# Patient Record
Sex: FEMALE | Race: WHITE
[De-identification: names, ages, dates, MRNs, and addresses within clinical notes are randomized per-mention and may not be internally consistent; named-entity substitution may affect disease eponyms.]

---

## 2023-08-14 ENCOUNTER — HOSPITAL ENCOUNTER (EMERGENCY)
Dept: HOSPITAL 15 - ER | Age: 58
Discharge: HOME | End: 2023-08-14
Payer: MEDICARE

## 2023-08-14 VITALS
TEMPERATURE: 97.7 F | RESPIRATION RATE: 17 BRPM | OXYGEN SATURATION: 96 % | SYSTOLIC BLOOD PRESSURE: 169 MMHG | HEART RATE: 63 BPM | DIASTOLIC BLOOD PRESSURE: 84 MMHG

## 2023-08-14 VITALS — WEIGHT: 209.66 LBS | BODY MASS INDEX: 38.58 KG/M2 | HEIGHT: 62 IN

## 2023-08-14 DIAGNOSIS — Z87.442: ICD-10-CM

## 2023-08-14 DIAGNOSIS — Z98.890: ICD-10-CM

## 2023-08-14 DIAGNOSIS — R10.12: Primary | ICD-10-CM

## 2023-08-14 DIAGNOSIS — N39.0: ICD-10-CM

## 2023-08-14 DIAGNOSIS — F12.90: ICD-10-CM

## 2023-08-14 DIAGNOSIS — Z88.5: ICD-10-CM

## 2023-08-14 LAB
ALBUMIN SERPL-MCNC: 3.6 G/DL (ref 3.4–5)
ALP SERPL-CCNC: 129 U/L (ref 45–117)
ALT SERPL-CCNC: 25 U/L (ref 13–56)
ANION GAP SERPL CALCULATED.3IONS-SCNC: 6 MMOL/L (ref 5–15)
BILIRUB SERPL-MCNC: 0.3 MG/DL (ref 0.2–1)
BUN SERPL-MCNC: 15 MG/DL (ref 7–18)
BUN/CREAT SERPL: 20.3 (ref 10–20)
CALCIUM SERPL-MCNC: 8.4 MG/DL (ref 8.5–10.1)
CHLORIDE SERPL-SCNC: 109 MMOL/L (ref 98–107)
CO2 SERPL-SCNC: 28 MMOL/L (ref 21–32)
GLUCOSE SERPL-MCNC: 95 MG/DL (ref 74–106)
HCT VFR BLD AUTO: 42.4 % (ref 36–46)
HGB BLD-MCNC: 14.3 G/DL (ref 12.2–16.2)
MCH RBC QN AUTO: 28.7 PG (ref 28–32)
MCV RBC AUTO: 84.9 FL (ref 80–100)
NRBC BLD QL AUTO: 0.5 %
POTASSIUM SERPL-SCNC: 4 MMOL/L (ref 3.5–5.1)
PROT SERPL-MCNC: 7.2 G/DL (ref 6.4–8.2)
SODIUM SERPL-SCNC: 143 MMOL/L (ref 136–145)

## 2023-08-14 PROCEDURE — 96372 THER/PROPH/DIAG INJ SC/IM: CPT

## 2023-08-14 PROCEDURE — 80053 COMPREHEN METABOLIC PANEL: CPT

## 2023-08-14 PROCEDURE — 85025 COMPLETE CBC W/AUTO DIFF WBC: CPT

## 2023-08-14 PROCEDURE — 81001 URINALYSIS AUTO W/SCOPE: CPT

## 2023-08-14 PROCEDURE — 74176 CT ABD & PELVIS W/O CONTRAST: CPT

## 2023-08-14 PROCEDURE — 83735 ASSAY OF MAGNESIUM: CPT

## 2023-08-14 PROCEDURE — 36415 COLL VENOUS BLD VENIPUNCTURE: CPT

## 2023-08-14 PROCEDURE — 99285 EMERGENCY DEPT VISIT HI MDM: CPT

## 2025-02-19 NOTE — [facility]
CLINICAL INDICATION:    animal bite



TECHNIQUE:   XY R HAND 3 VIEW XRAY



Comparison: None



FINDINGS/IMPRESSION: : 



There is no evidence of acute fracture or dislocation.



Soft tissues are unremarkable.



Electronically Signed by: Brian Harvey at 02/19/2025 13:33:54 PM

## 2025-02-19 NOTE — ED.PDOC
History of Present Illness(SKN


HPI Comments


59 year F presents for a 2 cm laceration to the dorsal aspect of the 4th middle 

phalanx.  Onset occurred 1 hour ago after she was bitten by her dog.


Pit bull


Immunization status up-to-date


Denies abnormal animal behavior


Denies history of immunocompromise (HIV hep B hep C)


Denies fever chills night sweats


Denies redness around the area


Chief Complaint:  Animal Bite


Time Seen by MD:  13:09


Primary Care Provider:  VICK[facility] AT [facility]


History of Present Illness:  Nurses Notes, Medications, Allergies


Allergies:  


Coded Allergies:  


     Codeine (Verified  Allergy, Unknown, 8/14/23)


Home Meds


Active Scripts


Dexamethasone (Dexamethasone 6-Day Dose) 1.5 Mg Tab, 1.5 MG PO as directed for 6

Days, #12 TAB


   take one tablet three times daily for 2 days


   take one tablet twice daily on days3,4


   take one tablet daily on days 5,6


   Prov:ELLIS FELIPE MD         10/30/24


Acyclovir (ZOVIRAX TABLET) 400 Mg Tb, 2 TAB PO five times a day for 10 Days, #60

TAB 0 Refills


   Prov:ELLIS FELIPE MD         10/30/24


Nitrofurantoin Monohydrate Mac (Macrobid) 100 Mg Cap, 100 MG PO BID for 5 Days, 

#10 CAP


   Prov:SOFIA VINCENT MD         8/14/23


Mode of Arrival:  Ambulatory





Past Medical History


PAST MEDICAL HISTORY:  Kidney Stones


Surgical History:  Appendectomy, Cholecystectomy, Hysterectomy


GYN History:  Denies all GYN Hx





Family History


Family History:  Reviewed,noncontributory to illness, Family hx of DM, Family hx

of Cancer, Family hx of HTN





Social History


Smoker:  Non-Smoker


Alcohol:  Occasionally


Drugs:  Marijuana


Lives In:  Home





All Other Systems:  Reviewed and Negative (per hpi)





Physical Exam


General Appearance:  No Apparent Distress, Normal


HEENT:  Normal ENT Inspection, Pharynx Normal, TMs Normal


Neck:  Full Range of Motion, Non-Tender, Normal, Normal Inspection


Respiratory:  Chest Non-Tender, Lungs Clear, No Accessory Muscle Use, No 

Respiratory Distress, Normal Breath Sounds


Cardiovascular:  No Edema, No JVD, No Murmur, No Gallop, Normal Peripheral 

Pulses, Regular Rate/Rhythm


Breast Exam:  Deferred


Gastrointestinal:  No Organomegaly, Non Tender, No Pulsatile Mass, Normal Bowel 

Sounds, Soft


Genitalia:  Deferred


Pelvic:  Deferred


Rectal:  Deferred


Extremities:  No calf tenderness, Normal capillary refill, Normal inspection, 

Normal range of motion, Non-tender, No pedal edema


Musculoskeletal :  


   Location:  Right


   Extremity Location:  Other (ring finger: 2 cm lac. hemostasis. no ttp. no 

tendon injury. fds/fdp intact)


   Apperance:  Normal


Neurologic:  Alert, CNs II-XII nml as Tested, No Motor Deficits, Normal Affect, 

Normal Mood, No Sensory Deficits


Cerebellar Function:  Normal


Reflexes:  Normal


Skin:  Dry, Normal Color, Warm


Lymphatic:  No Adenopathy





Was a procedure done?


Was a procedure done?:  Yes





Sedation


Sedation?:  No


Laceration Repair :  


Location


4th finger


Length


2


   Anesthetic:  Lidocaine, Bupivacaine


   Laceration Repair Prep:  Saline, Betadine


   Laceration Repair Wound Comple:  epidermis/dermis repair


   Laceration Repair:  Size (5-0), Simple, Bacitracin, Non-adherent gauze, Gauze


   Informed consent obtained:  Yes


   Risks, benefits, and alternati:  Yes





Differential Diagnosis (INTG)


Differential Diagnosis:  Laceration





X-Ray, Labs, Meds, VS





                                   Vital Signs








  Date Time  Temp Pulse Resp B/P (MAP) Pulse Ox O2 Delivery O2 Flow Rate FiO2


 


2/19/25 13:50  74 18  97 Room Air  


 


2/19/25 13:50 97.8 74 18 167/86 (113) 97   





 97.8       


 


2/19/25 12:04 97.8 74 18 167/86 (113) 97   








X-Ray, Labs, Meds, VS Comment





The skin edges of the laceration were infiltrated with  1% lidocaine


The skin surrounding the laceration was scrubbed with Betadine soaked sterile 

gauze


The laceration was irrigated under high-pressure with a 60 mL syringe


A total of 1L sterile water was used.  Including diluted Betadine solution





The laceration was prepped in sterile fashion with sterile drapes


On examination under direct light, there was no foreign body seen


The laceration was repaired in simple interrupted technique 


There was no continuing bleeding on repair.


There were no complications related to repair





Antibiotics rx


* Tetanus updated





Education and follow-up instructions provided 


Wound check in 2 days


Return sooner for signs of infection such as fevers, increased pain, redness, 

green, yellow discharge, or any concerns





Keep wound dry for 24 to 48 hours; dry dressing may be changed


Protect from sunlight and keep area clean and dry.  Use soap and water if it 

gets dirty


High risk of possible scarring and education provided on ways to minimize 

scarring after wound heals


Also provided education on possible complications post procedure including wound

dehiscence, infection, etc.


Time of 1ST Reevaluation:  14:48


Reevaluation 1ST:  Improved


Patient Education/Counseling:  Diagnosis, Treatment


Family Education/Counseling:  Diagnosis, Treatment





Departure 1


Departure


Time of Disposition:  14:50


Impression:  


   Primary Impression:  


   Laceration of finger


   Qualified Codes:  S61.214A - Laceration without foreign body of right ring 

   finger without damage to nail, initial encounter


   Additional Impression:  


   Dog bite


   Qualified Codes:  W54.0XXA - Bitten by dog, initial encounter


Disposition:  01 HOME / SELF CARE / HOMELESS


Condition:  Stable


e-Prescriptions


Amoxicillin & Pot Clavulanate (AUGMENTIN TABLET) 875 Mg Tb


875 MG PO BID for 7 Days, #14 TAB 0 Refills


   Prov: GHULAM HAMMOND NP         2/19/25





Critical Care Note


Critical Care Time?:  No





Stability


Stability form required:  No





Heart Score


Heart Score:  








Heart Score Response (Comments) Value


 


History N/A 0


 


EKG N/A 0


 


Age N/A 0


 


Risk Factors N/A 0


 


Troponin N/A 0


 


Total  0

















GHULAM HAMMOND NP                Feb 19, 2025 14:52